# Patient Record
Sex: MALE | Race: WHITE | NOT HISPANIC OR LATINO | Employment: STUDENT | URBAN - METROPOLITAN AREA
[De-identification: names, ages, dates, MRNs, and addresses within clinical notes are randomized per-mention and may not be internally consistent; named-entity substitution may affect disease eponyms.]

---
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
## 2021-01-04 ENCOUNTER — TREATMENT (OUTPATIENT)
Dept: PHYSICAL THERAPY | Facility: CLINIC | Age: 21
End: 2021-01-04

## 2021-01-04 DIAGNOSIS — Z74.09 IMPAIRED MOBILITY AND ACTIVITIES OF DAILY LIVING: ICD-10-CM

## 2021-01-04 DIAGNOSIS — Z78.9 IMPAIRED MOBILITY AND ACTIVITIES OF DAILY LIVING: ICD-10-CM

## 2021-01-04 DIAGNOSIS — Z98.890 STATUS POST SHOULDER SURGERY: Primary | ICD-10-CM

## 2021-01-04 DIAGNOSIS — M25.512 LEFT SHOULDER PAIN, UNSPECIFIED CHRONICITY: ICD-10-CM

## 2021-01-04 PROCEDURE — 97110 THERAPEUTIC EXERCISES: CPT | Performed by: PHYSICAL THERAPIST

## 2021-01-04 PROCEDURE — 97140 MANUAL THERAPY 1/> REGIONS: CPT | Performed by: PHYSICAL THERAPIST

## 2021-01-04 NOTE — PROGRESS NOTES
Physical Therapy Daily Progress Note      Visit # 3      Subjective   Pt reports continued improvement. No c/o pain.    Objective   See Exercise, Manual, and Modality Logs for complete treatment.     Assessment/Plan  No complaints with exercise progressions. PROM near normal limits. Progress per POC.           Manual Therapy:    10     mins  93837;  Therapeutic Exercise:    30     mins  86439;     Neuromuscular Zach:    0    mins  72500;    Therapeutic Activity:     0     mins  44797;     Gait Trainin     mins  20541;     Ultrasound:     0     mins  42324;    Work Hardening           0      mins 97834  Iontophoresis               0   mins 27928    Timed Treatment:   40   mins   Total Treatment:     45   mins    Jessica Pardo, PT  Physical Therapist

## 2021-01-07 ENCOUNTER — TREATMENT (OUTPATIENT)
Dept: PHYSICAL THERAPY | Facility: CLINIC | Age: 21
End: 2021-01-07

## 2021-01-07 DIAGNOSIS — M25.512 LEFT SHOULDER PAIN, UNSPECIFIED CHRONICITY: ICD-10-CM

## 2021-01-07 DIAGNOSIS — Z98.890 STATUS POST SHOULDER SURGERY: Primary | ICD-10-CM

## 2021-01-07 DIAGNOSIS — Z78.9 IMPAIRED MOBILITY AND ACTIVITIES OF DAILY LIVING: ICD-10-CM

## 2021-01-07 DIAGNOSIS — Z74.09 IMPAIRED MOBILITY AND ACTIVITIES OF DAILY LIVING: ICD-10-CM

## 2021-01-07 PROCEDURE — 97140 MANUAL THERAPY 1/> REGIONS: CPT | Performed by: PHYSICAL THERAPIST

## 2021-01-07 PROCEDURE — 97530 THERAPEUTIC ACTIVITIES: CPT | Performed by: PHYSICAL THERAPIST

## 2021-01-07 PROCEDURE — 97110 THERAPEUTIC EXERCISES: CPT | Performed by: PHYSICAL THERAPIST

## 2021-01-07 NOTE — PROGRESS NOTES
Physical Therapy Daily Progress Note      Visit # 4      Subjective   No new changes.    Objective   See Exercise, Manual, and Modality Logs for complete treatment.       Assessment/Plan  Progressing well per protocol w/o c/o pain. Issued band for isometric walk outs. Very little discomfort with AAROM IR reach to sacrum. Continues to guad w/ PROM abduction. Progress per POC.           Manual Therapy:    10     mins  75214;  Therapeutic Exercise:    15     mins  52320;     Neuromuscular Zach:    0    mins  46493;    Therapeutic Activity:     15     mins  87425;     Gait Trainin     mins  23849;     Ultrasound:     0     mins  80780;    Work Hardening           0      mins 51097  Iontophoresis               0   mins 62362    Timed Treatment:   40   mins   Total Treatment:     50   mins    Jessica Pardo, PT  Physical Therapist

## 2021-01-11 ENCOUNTER — TREATMENT (OUTPATIENT)
Dept: PHYSICAL THERAPY | Facility: CLINIC | Age: 21
End: 2021-01-11

## 2021-01-11 DIAGNOSIS — Z98.890 STATUS POST SHOULDER SURGERY: Primary | ICD-10-CM

## 2021-01-11 DIAGNOSIS — M25.512 LEFT SHOULDER PAIN, UNSPECIFIED CHRONICITY: ICD-10-CM

## 2021-01-11 DIAGNOSIS — Z78.9 IMPAIRED MOBILITY AND ACTIVITIES OF DAILY LIVING: ICD-10-CM

## 2021-01-11 DIAGNOSIS — Z74.09 IMPAIRED MOBILITY AND ACTIVITIES OF DAILY LIVING: ICD-10-CM

## 2021-01-11 PROCEDURE — 97140 MANUAL THERAPY 1/> REGIONS: CPT | Performed by: PHYSICAL THERAPIST

## 2021-01-11 PROCEDURE — 97110 THERAPEUTIC EXERCISES: CPT | Performed by: PHYSICAL THERAPIST

## 2021-01-11 NOTE — PROGRESS NOTES
Physical Therapy Daily Progress Note      Visit # 5      Subjective   No new changes.     Objective   See Exercise, Manual, and Modality Logs for complete treatment.     Assessment/Plan  Progressing well w/o c/o pain. Updated HEP to include sidelying ER and towel IR reach. Progress per POC.           Manual Therapy:    15     mins  54435;  Therapeutic Exercise:    25     mins  41656;     Neuromuscular Zach:    0    mins  99537;    Therapeutic Activity:     0     mins  07474;     Gait Trainin     mins  91378;     Ultrasound:     0     mins  21552;    Work Hardening           0      mins 41838  Iontophoresis               0   mins 79152    Timed Treatment:   40   mins   Total Treatment:     45   mins    Jessica Pardo, REGAN  Physical Therapist

## 2021-01-14 ENCOUNTER — TREATMENT (OUTPATIENT)
Dept: PHYSICAL THERAPY | Facility: CLINIC | Age: 21
End: 2021-01-14

## 2021-01-14 DIAGNOSIS — Z98.890 STATUS POST SHOULDER SURGERY: Primary | ICD-10-CM

## 2021-01-14 DIAGNOSIS — Z78.9 IMPAIRED MOBILITY AND ACTIVITIES OF DAILY LIVING: ICD-10-CM

## 2021-01-14 DIAGNOSIS — M25.512 LEFT SHOULDER PAIN, UNSPECIFIED CHRONICITY: ICD-10-CM

## 2021-01-14 DIAGNOSIS — Z74.09 IMPAIRED MOBILITY AND ACTIVITIES OF DAILY LIVING: ICD-10-CM

## 2021-01-14 PROCEDURE — 97110 THERAPEUTIC EXERCISES: CPT | Performed by: PHYSICAL THERAPIST

## 2021-01-14 PROCEDURE — 97530 THERAPEUTIC ACTIVITIES: CPT | Performed by: PHYSICAL THERAPIST

## 2021-01-14 NOTE — PROGRESS NOTES
Physical Therapy Daily Progress Note      Visit # 6      Subjective   No issues.    Objective   See Exercise, Manual, and Modality Logs for complete treatment.       Assessment/Plan  Excellent tolerance to all therapeutic activities w/o c/o pain. Able to consistently progress weight/resistance with strengthening. Mild tightness with PROM persists. Overall, PROM near normal limits. Encouraged pt to schedule with PT clinic [facility].          Manual Therapy:    5     mins  18201;  Therapeutic Exercise:    25     mins  65760;     Neuromuscular Zach:    0    mins  74171;    Therapeutic Activity:     15     mins  43444;     Gait Trainin     mins  38013;     Ultrasound:     0     mins  98176;    Work Hardening           0      mins 90893  Iontophoresis               0   mins 79564    Timed Treatment:   45   mins   Total Treatment:     55   mins    Jessica Pardo, PT  Physical Therapist

## 2021-01-15 ENCOUNTER — OFFICE VISIT (OUTPATIENT)
Dept: ORTHOPEDIC SURGERY | Facility: CLINIC | Age: 21
End: 2021-01-15

## 2021-01-15 VITALS — BODY MASS INDEX: 26.11 KG/M2 | TEMPERATURE: 97.4 F | WEIGHT: 182.4 LBS | HEIGHT: 70 IN

## 2021-01-15 DIAGNOSIS — Z98.890 STATUS POST LABRAL REPAIR OF SHOULDER: Primary | ICD-10-CM

## 2021-01-15 PROCEDURE — 99024 POSTOP FOLLOW-UP VISIT: CPT | Performed by: ORTHOPAEDIC SURGERY

## 2021-01-15 NOTE — PROGRESS NOTES
Labral Repair     Patient: Lobo Linder        YOB: 2000      Chief Complaints: left shoulder pain      History of Present Illness: Pt is here f/u shoulder arthroscopy, labral repair he is doing great he is about 5 weeks out has no pain is progressing with physical therapy understands his limitations        Allergies: No Known Allergies    Medications:   Home Medications:  Current Outpatient Medications on File Prior to Visit   Medication Sig   • fexofenadine (ALLEGRA) 180 MG tablet Take 180 mg by mouth Daily.   • ondansetron (Zofran) 4 MG tablet Take 1 tablet by mouth Every 8 (Eight) Hours As Needed for Nausea or Vomiting.   • oxyCODONE-acetaminophen (PERCOCET) 5-325 MG per tablet Take 1-2 tablets by mouth every 4 hours as needed for severe pain     No current facility-administered medications on file prior to visit.      Current Medications:  Scheduled Meds:  Continuous Infusions:No current facility-administered medications for this visit.     PRN Meds:.          Physical Exam: 20 y.o. male  General Appearance:    Alert, cooperative, in no acute distress                 There were no vitals filed for this visit.   Patient is alert and oriented ×3 no acute distress normal mood physical exam.  Physical exam of the shoulder, incisions looked good there is no erythema,no signs or sx of infection.  Range of motion is full with exception of mild limitation of external rotation which I like and want to see  Assessment  S/P shoulder scope labral repair, overall doing good.          Plan:  Reiterate precautions he will continue to physical therapy.  He is headed back to [place] to school on Sunday therefore I will give him a new therapy prescription a copy of his op note and a copy of the protocol he wants to start flying again I want him to wait at least 3 more weeks till he is 8 weeks out at that point he will go to the simulator to make sure he is safe and then flying will always be with one of his  instructors I will see him back in some around 8 weeks when he is back in town

## 2021-01-21 ENCOUNTER — TREATMENT (OUTPATIENT)
Dept: PHYSICAL THERAPY | Facility: CLINIC | Age: 21
End: 2021-01-21

## 2021-01-21 DIAGNOSIS — M25.512 LEFT SHOULDER PAIN, UNSPECIFIED CHRONICITY: ICD-10-CM

## 2021-01-21 DIAGNOSIS — Z98.890 STATUS POST SHOULDER SURGERY: Primary | ICD-10-CM

## 2021-01-21 PROCEDURE — 97110 THERAPEUTIC EXERCISES: CPT | Performed by: PHYSICAL THERAPIST

## 2021-01-21 PROCEDURE — 97112 NEUROMUSCULAR REEDUCATION: CPT | Performed by: PHYSICAL THERAPIST

## 2021-01-21 NOTE — PROGRESS NOTES
Physical Therapy Daily Progress Note      Visit #: 7    Lobo Linder reports 0/10 pain today at rest.  Pt reports that he has no pain and has not had any pain. Pt reports that he is sleeping comfortably.         Objective Pt present to PT today with no distress at rest.     Pt with no tenderness over anterior L shoulder.     Pt with limited IR due to tightness but no pain at end range.     Pt tolerated activities well today without pain in the L shoulder.       See Exercise, Manual, and Modality Logs for complete treatment.     Assessment/Plan  Pt is doing well and is 6 weeks out of surgery. Pt to continue with scapular stabilization and isometric shoulder strengthening. Pt would benefit from PT to help increase L shoulder strength, activity tolerance, and function.       Progress per Plan of Care  Progress as tolerated    Visit Diagnosis:    ICD-10-CM ICD-9-CM   1. Status post shoulder surgery  Z98.890 V45.89   2. Left shoulder pain, unspecified chronicity  M25.512 719.41            Manual Therapy:    4     mins  14171;  Therapeutic Exercise:    30     mins  45736;     Neuromuscular Zcah:    12    mins  68699;    Therapeutic Activity:          mins  37567;     Gait Training:           mins  64929;     Ultrasound:          mins  19683;    Electrical Stimulation:         mins  39868 ( );  Dry Needling          mins self-pay  Iontophoresis          mins 18585      Timed Treatment:   46   mins   Total Treatment:     50   mins    Damion Atkinson, PT  Physical Therapist

## 2021-01-27 ENCOUNTER — TREATMENT (OUTPATIENT)
Dept: PHYSICAL THERAPY | Facility: CLINIC | Age: 21
End: 2021-01-27

## 2021-01-27 DIAGNOSIS — Z78.9 IMPAIRED MOBILITY AND ACTIVITIES OF DAILY LIVING: ICD-10-CM

## 2021-01-27 DIAGNOSIS — M25.512 LEFT SHOULDER PAIN, UNSPECIFIED CHRONICITY: ICD-10-CM

## 2021-01-27 DIAGNOSIS — Z74.09 IMPAIRED MOBILITY AND ACTIVITIES OF DAILY LIVING: ICD-10-CM

## 2021-01-27 DIAGNOSIS — Z98.890 STATUS POST SHOULDER SURGERY: Primary | ICD-10-CM

## 2021-01-27 PROCEDURE — 97112 NEUROMUSCULAR REEDUCATION: CPT | Performed by: PHYSICAL THERAPIST

## 2021-01-27 PROCEDURE — 97110 THERAPEUTIC EXERCISES: CPT | Performed by: PHYSICAL THERAPIST

## 2021-01-27 NOTE — PROGRESS NOTES
Physical Therapy Daily Progress Note      Visit #: 8    Lobo Linder reports 0/10 pain today at rest.  Pt reports no problems or pain after last visit or over the last week.         Objective Pt present to PT today with no distress at rest.     Pt with slight limitation in IR of the L shoulder when scapula held steady.     Pt with no increased pain in the L shoulder with activities in the clinic today.       See Exercise, Manual, and Modality Logs for complete treatment.     Assessment/Plan  Pt continues to progress well without pain in the L shoulder and is maintaining motion in the L shoulder. Pt would benefit from PT to help improve L shoulder stability, strength, and activity tolerance to return to pain free function.       Progress per Plan of Care  Progress as tolerated    Visit Diagnosis:    ICD-10-CM ICD-9-CM   1. Status post shoulder surgery  Z98.890 V45.89   2. Left shoulder pain, unspecified chronicity  M25.512 719.41   3. Impaired mobility and activities of daily living  Z74.09 V49.89    Z78.9             Manual Therapy:    4     mins  15251;  Therapeutic Exercise:    27     mins  48625;     Neuromuscular Zach:    23    mins  78311;    Therapeutic Activity:          mins  01230;     Gait Training:           mins  39861;     Ultrasound:          mins  23926;    Electrical Stimulation:         mins  10701 ( );  Dry Needling          mins self-pay  Iontophoresis          mins 33966      Timed Treatment:   54   mins   Total Treatment:     54   mins    Damion Atkinson, PT  Physical Therapist

## 2021-02-01 ENCOUNTER — TREATMENT (OUTPATIENT)
Dept: PHYSICAL THERAPY | Facility: CLINIC | Age: 21
End: 2021-02-01

## 2021-02-01 DIAGNOSIS — Z98.890 STATUS POST SHOULDER SURGERY: Primary | ICD-10-CM

## 2021-02-01 DIAGNOSIS — M25.512 LEFT SHOULDER PAIN, UNSPECIFIED CHRONICITY: ICD-10-CM

## 2021-02-01 PROCEDURE — 97110 THERAPEUTIC EXERCISES: CPT | Performed by: PHYSICAL THERAPIST

## 2021-02-01 PROCEDURE — 97112 NEUROMUSCULAR REEDUCATION: CPT | Performed by: PHYSICAL THERAPIST

## 2021-02-01 NOTE — PROGRESS NOTES
Physical Therapy Daily Progress Note      Visit #: 9    Lobo Linder reports 0/10 pain today at rest.  Pt reports that he has felt fine and that he has good motion. Pt reports that he wants to get his strength back up. Pt reports that he has not had any pain since last session or soreness following last visit.         Objective Pt present to PT today with no distress at rest.     L shoulder IR: 73 degrees    Pt with full PROM in L shoulder with all other motions. Lacking IR may be due to increased ER of the L shoulder.     Pt tolerated activities well today without any pain in the L shoulder.       See Exercise, Manual, and Modality Logs for complete treatment.     Assessment/Plan  Pt is doing well with good motion actively and passively in the L shoulder with no pain. Pt to continue with PT to help improve L shoulder strength, stability, and function.       Progress per Plan of Care  Progress strengthening    Visit Diagnosis:    ICD-10-CM ICD-9-CM   1. Status post shoulder surgery  Z98.890 V45.89   2. Left shoulder pain, unspecified chronicity  M25.512 719.41            Manual Therapy:    4     mins  32664;  Therapeutic Exercise:    30     mins  90114;     Neuromuscular Zach:    24    mins  73595;    Therapeutic Activity:          mins  27435;     Gait Training:           mins  41334;     Ultrasound:          mins  11589;    Electrical Stimulation:         mins  23313 ( );  Dry Needling          mins self-pay  Iontophoresis          mins 71884      Timed Treatment:   58   mins   Total Treatment:     58   mins    Damion Atkinson, PT  Physical Therapist

## 2021-02-08 ENCOUNTER — TREATMENT (OUTPATIENT)
Dept: PHYSICAL THERAPY | Facility: CLINIC | Age: 21
End: 2021-02-08

## 2021-02-08 DIAGNOSIS — M25.512 LEFT SHOULDER PAIN, UNSPECIFIED CHRONICITY: ICD-10-CM

## 2021-02-08 DIAGNOSIS — Z98.890 STATUS POST SHOULDER SURGERY: Primary | ICD-10-CM

## 2021-02-08 PROCEDURE — 97112 NEUROMUSCULAR REEDUCATION: CPT | Performed by: PHYSICAL THERAPIST

## 2021-02-08 PROCEDURE — 97110 THERAPEUTIC EXERCISES: CPT | Performed by: PHYSICAL THERAPIST

## 2021-02-08 NOTE — PROGRESS NOTES
Physical Therapy Daily Progress Note      Visit #: 10    Lobo Linder reports 0/10 pain today at rest.  Pt reports no pain after last session and no pain with daily activities. Pt reports that he has had no trouble with the L shoulder.         Objective Pt present to PT today with no distress at rest.     Pt tolerated activities well today without pain in the L shoulder.     Pt with fatigue in the L shoulder with OH activities       See Exercise, Manual, and Modality Logs for complete treatment.     Assessment/Plan  Pt continues to improve with activities in the clinic with no increased pain in the L shoulder. Pt does fatigue with activities OH and would benefit from PT to help improve activity tolerance and L shoulder stability.       Progress per Plan of Care  Progress OH strengthening    Visit Diagnosis:    ICD-10-CM ICD-9-CM   1. Status post shoulder surgery  Z98.890 V45.89   2. Left shoulder pain, unspecified chronicity  M25.512 719.41            Manual Therapy:         mins  39463;  Therapeutic Exercise:    30     mins  05046;     Neuromuscular Zach:    24    mins  85551;    Therapeutic Activity:          mins  87464;     Gait Training:           mins  97867;     Ultrasound:          mins  33086;    Electrical Stimulation:         mins  02304 ( );  Dry Needling          mins self-pay  Iontophoresis          mins 56190      Timed Treatment:   54   mins   Total Treatment:     55   mins    Damion Atkinson, PT  Physical Therapist

## 2021-02-12 ENCOUNTER — TREATMENT (OUTPATIENT)
Dept: PHYSICAL THERAPY | Facility: CLINIC | Age: 21
End: 2021-02-12

## 2021-02-12 DIAGNOSIS — M25.512 LEFT SHOULDER PAIN, UNSPECIFIED CHRONICITY: ICD-10-CM

## 2021-02-12 DIAGNOSIS — Z98.890 STATUS POST SHOULDER SURGERY: Primary | ICD-10-CM

## 2021-02-12 PROCEDURE — 97112 NEUROMUSCULAR REEDUCATION: CPT | Performed by: PHYSICAL THERAPIST

## 2021-02-12 PROCEDURE — 97110 THERAPEUTIC EXERCISES: CPT | Performed by: PHYSICAL THERAPIST

## 2021-02-12 NOTE — PROGRESS NOTES
Physical Therapy Daily Progress Note      Visit #: 11    Lobo Linder reports 0/10 pain today at rest.  Pt reports that he has not had any issues since last visit. Pt reports that even with the increased intensity last visit, he had no pain in the L shoulder.         Objective Pt present to PT today with no distress at rest.     Pt tolerated activities in the clinic well today without pain in the L shoulder.     Pt tolerated OH activities in the clinic without pain.       See Exercise, Manual, and Modality Logs for complete treatment.     Assessment/Plan  Pt continues to do well with activities in the clinic and have no pain in the L shoulder with resistance exercises. Pt to continue to progress OH exercise and increase stabilization and strengthening activities to improve L shoulder function.       Progress per Plan of Care  Assess reaction to activities in the clinic    Visit Diagnosis:    ICD-10-CM ICD-9-CM   1. Status post shoulder surgery  Z98.890 V45.89   2. Left shoulder pain, unspecified chronicity  M25.512 719.41            Manual Therapy:         mins  84141;  Therapeutic Exercise:    39     mins  14179;     Neuromuscular Zach:    17    mins  49496;    Therapeutic Activity:          mins  30769;     Gait Training:           mins  53721;     Ultrasound:          mins  58853;    Electrical Stimulation:         mins  22886 ( );  Dry Needling          mins self-pay  Iontophoresis          mins 18128      Timed Treatment:   56   mins   Total Treatment:     58   mins    Damion Atkinson, PT  Physical Therapist

## 2021-04-02 ENCOUNTER — IMMUNIZATION (OUTPATIENT)
Dept: VACCINE CLINIC | Facility: HOSPITAL | Age: 21
End: 2021-04-02

## 2021-04-02 PROCEDURE — 91300 HC SARSCOV02 VAC 30MCG/0.3ML IM: CPT | Performed by: INTERNAL MEDICINE

## 2021-04-02 PROCEDURE — 0001A: CPT | Performed by: INTERNAL MEDICINE

## 2021-04-28 ENCOUNTER — IMMUNIZATION (OUTPATIENT)
Dept: VACCINE CLINIC | Facility: HOSPITAL | Age: 21
End: 2021-04-28

## 2021-04-28 PROCEDURE — 91300 HC SARSCOV02 VAC 30MCG/0.3ML IM: CPT | Performed by: INTERNAL MEDICINE

## 2021-04-28 PROCEDURE — 0002A: CPT | Performed by: INTERNAL MEDICINE

## 2022-02-17 NOTE — PROGRESS NOTES
Labral Repair 1st visit    Patient: Lobo Linder        YOB: 2000      Chief Complaints: shoulder pain left      History of Present Illness: Pt is here f/u shoulder arthroscopy, labral repair on his left shoulder he is doing great states he only takes an occasional pain medicine overall is doing well        Allergies: No Known Allergies    Medications:   Home Medications:  Current Outpatient Medications on File Prior to Visit   Medication Sig   • fexofenadine (ALLEGRA) 180 MG tablet Take 180 mg by mouth Daily.   • ondansetron (Zofran) 4 MG tablet Take 1 tablet by mouth Every 8 (Eight) Hours As Needed for Nausea or Vomiting.   • oxyCODONE-acetaminophen (PERCOCET) 5-325 MG per tablet Take 1-2 tablets by mouth every 4 hours as needed for severe pain     No current facility-administered medications on file prior to visit.      Current Medications:  Scheduled Meds:  Continuous Infusions:No current facility-administered medications for this visit.     PRN Meds:.          Physical Exam: 20 y.o. male  General Appearance:    Alert, cooperative, in no acute distress                 There were no vitals filed for this visit.   Patient is alert and oriented ×3 no acute distress normal mood physical exam.  Physical exam of the shoulder, incisions looked good there is no erythema,no signs or sx of infection.    Assessment  S/P shoulder scope.  I did review intraoperative findings and arthroscopic pictures with the patient.          Plan: To remove sutures today place Steri-Strips, review restrictions and f/u in 4 weeks I will give him a regular sling to transition to we talked about restrictions we will start physical therapy the week after next I will see him back 4 weeks              
Abdominal Pain, N/V/D

## 2022-07-07 ENCOUNTER — OFFICE VISIT (OUTPATIENT)
Dept: SPORTS MEDICINE | Facility: CLINIC | Age: 22
End: 2022-07-07

## 2022-07-07 VITALS
SYSTOLIC BLOOD PRESSURE: 120 MMHG | HEIGHT: 70 IN | BODY MASS INDEX: 25.91 KG/M2 | OXYGEN SATURATION: 99 % | DIASTOLIC BLOOD PRESSURE: 60 MMHG | WEIGHT: 181 LBS | HEART RATE: 64 BPM | RESPIRATION RATE: 16 BRPM | TEMPERATURE: 98.1 F

## 2022-07-07 DIAGNOSIS — D68.51 FACTOR V LEIDEN MUTATION: ICD-10-CM

## 2022-07-07 DIAGNOSIS — Z00.00 ANNUAL PHYSICAL EXAM: Primary | ICD-10-CM

## 2022-07-07 DIAGNOSIS — F90.0 ADHD (ATTENTION DEFICIT HYPERACTIVITY DISORDER), INATTENTIVE TYPE: ICD-10-CM

## 2022-07-07 PROBLEM — S43.439A LABRAL TEAR OF SHOULDER: Status: RESOLVED | Noted: 2020-11-09 | Resolved: 2022-07-07

## 2022-07-07 PROCEDURE — 99395 PREV VISIT EST AGE 18-39: CPT | Performed by: FAMILY MEDICINE

## 2022-07-07 PROCEDURE — 99213 OFFICE O/P EST LOW 20 MIN: CPT | Performed by: FAMILY MEDICINE

## 2022-07-07 RX ORDER — ATOMOXETINE 40 MG/1
40 CAPSULE ORAL DAILY
Qty: 30 CAPSULE | Refills: 1 | Status: SHIPPED | OUTPATIENT
Start: 2022-07-07

## 2022-07-07 NOTE — PROGRESS NOTES
"Lobo Linder is here today for an annual physical exam.     Eating a healthy diet. Exercising routinely.     Transferred to [place] from [facility]. Started [employer] classes online in June, in person this fall.  Still feels like L shoulder is \"sliding.\" Trying to get to band work. Will f/up w/Dr. Mccann.    I have reviewed the patient's medical, family, and social history in detail and updated the computerized patient record.    Health Maintenance   Topic Date Due   • HPV VACCINES (1 - Male 2-dose series) Never done   • HEPATITIS C SCREENING  Never done   • TDAP/TD VACCINES (2 - Td or Tdap) 07/16/2022   • INFLUENZA VACCINE  10/01/2022   • ANNUAL PHYSICAL  07/08/2023   • COVID-19 Vaccine  Completed   • MENINGOCOCCAL VACCINE  Completed   • Pneumococcal Vaccine 0-64  Aged Out         /60 (BP Location: Right arm, Patient Position: Sitting, Cuff Size: Adult)   Pulse 64   Temp 98.1 °F (36.7 °C) (Temporal)   Resp 16   Ht 177.8 cm (70\")   Wt 82.1 kg (181 lb)   SpO2 99%   BMI 25.97 kg/m²      Physical Exam    Mask worn throughout encounter  Vital signs reviewed.  General appearance: No acute distress  Eyes: conjunctiva clear without erythema; pupils equally round and reactive  ENT: external ears and nose normal; hearing normal   Neck: supple; no thyromegaly  CV: normal rate and rhythm; no peripheral edema  Respiratory: normal respiratory effort; lungs clear to auscultation bilaterally  GI: Bowel sounds x4, soft, nontender  MSK: normal gait and station; no focal joint deformity or swelling  Skin: no rash or wounds; normal turgor  Neuro: cranial nerves 2-12 grossly intact; normal sensation to light touch  Psych: mood and affect normal; recent and remote memory intact    No visits with results within 2 Week(s) from this visit.   Latest known visit with results is:   Lab on 12/07/2020   Component Date Value Ref Range Status   • SARS-CoV-2 PCR 12/07/2020 Not Detected  Not Detected Final    Nucleic acid specific to " SARS-CoV-2 (COVID-19) virus was not detected in  this sample by the TaqPath (TM) COVID-19 Combo Kit.          SARS-CoV-2 (COVID-19) nucleic acid testing performed using [employer] Aptima (R) SARS-CoV-2 Assay or [employer] TaqPath (TM)  COVID-19 Combo Kit as indicated above under Emergency Use Authorization (EUA) from the FDA. Aptima (R) and TaqPath (TM) test performance  characteristics verified by [facility] in accordance with the FDAs Guidance Document (Policy for Diagnostic Tests for Coronavirus Disease-2019  during the Public Health Emergency) issued on March 16, 2020. The laboratory is regulated under CLIA as qualified to perform high-complexity testing  Unless otherwise noted, all testing was performed at [facility], CLIA No. [id number], [place] State Licensee No. [id number]        Diagnoses and all orders for this visit:    1. Annual physical exam (Primary)    2. Factor V Leiden mutation (HCC)        Encourage healthy diet and exercise.  Encourage patient to stay up to date on screening examinations as indicated based on age and risk factors.    EMR Dragon/Transcription disclaimer:    Much of this encounter note is an electronic transcription/translation of spoken language to printed text.  The electronic translation of spoken language may permit erroneous, or at times, nonsensical words or phrases to be inadvertently transcribed.  Although I have reviewed the note for such errors some may still exist.

## 2022-07-07 NOTE — PROGRESS NOTES
"Lobo is a 22 y.o. year old male presents to [facility]    Chief Complaint   Patient presents with   • Annual Exam     CPE        History of Present Illness  H/o ADHD. eval at [facility] 2017. Brought paperwork. Having difficulty concentrating. Would like to trial another Rx. Adderal XR - more labile emotionally when on it.    I have reviewed the patient's medical, family, and social history in detail and updated the computerized patient record.    /60 (BP Location: Right arm, Patient Position: Sitting, Cuff Size: Adult)   Pulse 64   Temp 98.1 °F (36.7 °C) (Temporal)   Resp 16   Ht 177.8 cm (70\")   Wt 82.1 kg (181 lb)   SpO2 99%   BMI 25.97 kg/m²      Physical Exam    Mask worn thru encounter  Vital signs reviewed.   General: No acute distress.  Eyes: conjunctiva clear; pupils equally round and reactive  ENT: external ears atraumatic  CV: no peripheral edema  Resp: normal respiratory effort, no use of accessory muscles  Skin: no rashes or wounds; normal turgor  Psych: mood and affect appropriate; recent and remote memory intact  Neuro: sensation to light touch intact               Diagnoses and all orders for this visit:    Annual physical exam    Factor V Leiden mutation (HCC)    ADHD (attention deficit hyperactivity disorder), inattentive type  -     atomoxetine (Strattera) 40 MG capsule; Take 1 capsule by mouth Daily.      Reviewed outside records, specifically his ADHD testing performed in 2017.  From clinical perspective, it does not seem as though things have changed significantly in this interim.  We discussed treatment options, medications specific.  Trial of Strattera, side effects discussed.  Drug holiday discussed.  Follow-up in 6 weeks.      Follow Up   Return in about 6 weeks (around 8/18/2022) for Recheck.  Patient was given instructions and counseling regarding his condition or for health maintenance advice. Please see specific information pulled " into the AVS if appropriate.     EMR Dragon/Transcription disclaimer:    Much of this encounter note is an electronic transcription/translation of spoken language to printed text.  The electronic translation of spoken language may permit erroneous, or at times, nonsensical words or phrases to be inadvertently transcribed.  Although I have reviewed the note for such errors some may still exist.

## 2022-08-18 ENCOUNTER — OFFICE VISIT (OUTPATIENT)
Dept: SPORTS MEDICINE | Facility: CLINIC | Age: 22
End: 2022-08-18

## 2022-08-18 VITALS
RESPIRATION RATE: 16 BRPM | SYSTOLIC BLOOD PRESSURE: 128 MMHG | TEMPERATURE: 97.1 F | DIASTOLIC BLOOD PRESSURE: 70 MMHG | BODY MASS INDEX: 26.92 KG/M2 | OXYGEN SATURATION: 98 % | HEIGHT: 70 IN | HEART RATE: 84 BPM | WEIGHT: 188 LBS

## 2022-08-18 DIAGNOSIS — D22.9 BENIGN NEVUS: ICD-10-CM

## 2022-08-18 DIAGNOSIS — F90.0 ADHD (ATTENTION DEFICIT HYPERACTIVITY DISORDER), INATTENTIVE TYPE: Primary | ICD-10-CM

## 2022-08-18 PROCEDURE — 99213 OFFICE O/P EST LOW 20 MIN: CPT | Performed by: FAMILY MEDICINE

## 2022-08-18 NOTE — PROGRESS NOTES
"Lobo is a 22 y.o. year old male presents to [facility]    Chief Complaint   Patient presents with   • Follow-up     6 wk f/u from CPE on 07/07/2022        History of Present Illness  F/up ADHD: \"I'm loving Strattera.\" Taking only as needed for attention at this time. Is helping him keep his focus for time needed. No adverse effects reported - no GI upset, loss of appetite, loss of sleep. Takes in AM when needed.    Additionally, request evaluation of mole on his left lower leg.  Has been there for years.  Has not changed in size though he has noticed some clearing of the middle of it.    I have reviewed the patient's medical, family, and social history in detail and updated the computerized patient record.    /70 (BP Location: Left arm, Patient Position: Sitting, Cuff Size: Adult)   Pulse 84   Temp 97.1 °F (36.2 °C) (Temporal)   Resp 16   Ht 177.8 cm (70\")   Wt 85.3 kg (188 lb)   SpO2 98%   BMI 26.98 kg/m²      Physical Exam    Mask worn thru encounter  Vital signs reviewed.   General: No acute distress.  Eyes: conjunctiva clear; pupils equally round and reactive  ENT: external ears atraumatic  CV: no peripheral edema  Resp: normal respiratory effort, no use of accessory muscles  Skin: no rashes or wounds; normal turgor; benign nevus along the left lower extremity that measures 5 x 3 cm.  See scanned media.  Psych: mood and affect appropriate; recent and remote memory intact  Neuro: sensation to light touch intact                 Diagnoses and all orders for this visit:    ADHD (attention deficit hyperactivity disorder), inattentive type    Benign nevus    1.  Doing well with Strattera.  No adverse effects reported.  Refill as needed.  No monitoring required as this is not a controlled substance.  2.  Reassurance given.  Photo taken, scanned in media.  Monitor for any changes.      Follow Up   Return in about 1 year (around 8/18/2023) for CPE.  Patient was given " instructions and counseling regarding his condition or for health maintenance advice. Please see specific information pulled into the AVS if appropriate.     EMR Dragon/Transcription disclaimer:    Much of this encounter note is an electronic transcription/translation of spoken language to printed text.  The electronic translation of spoken language may permit erroneous, or at times, nonsensical words or phrases to be inadvertently transcribed.  Although I have reviewed the note for such errors some may still exist.

## 2022-10-21 ENCOUNTER — CLINICAL SUPPORT (OUTPATIENT)
Dept: SPORTS MEDICINE | Facility: CLINIC | Age: 22
End: 2022-10-21

## 2022-10-21 DIAGNOSIS — Z11.1 SCREENING-PULMONARY TB: ICD-10-CM

## 2022-10-21 DIAGNOSIS — Z23 IMMUNIZATION DUE: Primary | ICD-10-CM

## 2022-10-21 PROCEDURE — 90471 IMMUNIZATION ADMIN: CPT | Performed by: FAMILY MEDICINE

## 2022-10-21 PROCEDURE — 90715 TDAP VACCINE 7 YRS/> IM: CPT | Performed by: FAMILY MEDICINE

## 2022-10-25 LAB
GAMMA INTERFERON BACKGROUND BLD IA-ACNC: 0.02 IU/ML
M TB IFN-G BLD-IMP: NEGATIVE
M TB IFN-G CD4+ BCKGRND COR BLD-ACNC: 0.03 IU/ML
M TB IFN-G CD4+CD8+ BCKGRND COR BLD-ACNC: 0.04 IU/ML
MITOGEN IGNF BLD-ACNC: >10 IU/ML
QUANTIFERON INCUBATION: NORMAL
SERVICE CMNT-IMP: NORMAL

## 2024-11-08 ENCOUNTER — OFFICE VISIT (OUTPATIENT)
Dept: ORTHOPEDIC SURGERY | Facility: CLINIC | Age: 24
End: 2024-11-08
Payer: COMMERCIAL

## 2024-11-08 VITALS — WEIGHT: 185.8 LBS | BODY MASS INDEX: 26.6 KG/M2 | HEIGHT: 70 IN | TEMPERATURE: 98.1 F

## 2024-11-08 DIAGNOSIS — S83.512A RUPTURE OF ANTERIOR CRUCIATE LIGAMENT OF LEFT KNEE, INITIAL ENCOUNTER: ICD-10-CM

## 2024-11-08 DIAGNOSIS — M25.562 ACUTE PAIN OF LEFT KNEE: Primary | ICD-10-CM

## 2024-11-08 DIAGNOSIS — M25.562 LEFT KNEE PAIN, UNSPECIFIED CHRONICITY: Primary | ICD-10-CM

## 2024-11-08 NOTE — PROGRESS NOTES
New Knee      Patient: Lobo Linder        YOB: 2000    Medical Record Number: [id number]      Left knee pain  Chief Complaints:       History of Present Illness: This is a 24-year-old male who was playing soccer that mocking bird this week and when he planted he went to stop planted his foot and his knee gave way.  He did not feel a pop he does states she has had an effusion he has had persistent pain and presents for evaluation.  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  No prior history of knee injury        Allergies: No Known Allergies    Medications:   Home Medications:  Current Outpatient Medications on File Prior to Visit   Medication Sig    atomoxetine (Strattera) 40 MG capsule Take 1 capsule by mouth Daily. (Patient not taking: Reported on 11/8/2024)     No current facility-administered medications on file prior to visit.     Current Medications:  Scheduled Meds:  Continuous Infusions:No current facility-administered medications for this visit.    PRN Meds:.    Past Medical History:   Diagnosis Date    ADHD (attention deficit hyperactivity disorder), inattentive type 8/18/2022    Contact dermatitis     Factor V deficiency     Labral tear of shoulder     Seasonal allergies         Past Surgical History:   Procedure Laterality Date    NO PAST SURGERIES      SHOULDER ARTHROSCOPY W/ SUPERIOR LABRAL ANTERIOR POSTERIOR REPAIR Left 12/9/2020    Procedure: SHOULDER ARTHROSCOPY ANTERIOR LABRAL REPAIR;  Surgeon: Loretta Mccann MD;  Location: [facility];  Service: Orthopedics;  Laterality: Left;        Social History     Occupational History    Not on file   Tobacco Use    Smoking status: Never    Smokeless tobacco: Never   Vaping Use    Vaping status: Never Used   Substance and Sexual Activity    Alcohol use: No    Drug use: No    Sexual activity: Defer      Social History     Social History Narrative    Not on file        Family History   Problem  "Relation Age of Onset    Factor V Leiden deficiency Mother     Malig Hyperthermia Neg Hx              Review of Systems:     Review of Systems      Physical Exam: 24 y.o. male  General Appearance:    Alert, cooperative, in no acute distress                   Vitals:    11/08/24 0800   Temp: 98.1 °F (36.7 °C)   TempSrc: Temporal   Weight: 84.3 kg (185 lb 12.8 oz)   Height: 177.8 cm (70\")   PainSc:   3   PainLoc: Knee      Patient is alert and read ×3 no acute distress appears her above-listed at height weight and age.  Affect is normal respiratory rate is normal unlabored. Heart rate regular rate rhythm, sclera, dentition and hearing are normal for the purpose of this exam.        Ortho Exam  physical exam of the left knee he has no overlying skin changes no lymphedema lymphadenopathy they have no effusion is full range of motion they have some mild tenderness laterally no tenderness medially they have pain with bounce home no pain with Carlos he has a positive Lockman.  I do not get a pivot shift however they have a lot of hamstring guarding.  Quads are good calf is soft and nontender there is no overlying skin changes, s good hip range of motion and normal ankle exam   Procedures             Radiology:   AP, Lateral and merchant views of the left knee  were ordered/reviewed to evauateknee pain.  I have no comparative film growth plates are closed I see no abnormality  Imaging Results (Most Recent)       Procedure Component Value Units Date/Time    XR Knee 3 View Left [[id number]] Resulted: 11/08/24 0809     Updated: 11/08/24 0809    Impression:      Ordering physician's impression is located in the Encounter Note dated 11/08/24. X-ray performed in the DR room.            Assessment/Plan:  Left knee injury I am pretty confident his ACL is torn which I discussed with him in detail he really does not have much of an effusion may be a mild effusion hopefully his menisci are okay plan is to proceed with an MRI to " really lay everything on the table we will get him into physical therapy.  He will then have to decide with regard to his job does he want to delay that and get this reconstructed or start working and then reconstruct at a later date I talked him about what he would have to avoid if he decided to do that which I think would be fine

## 2024-11-11 ENCOUNTER — TELEPHONE (OUTPATIENT)
Dept: ORTHOPEDIC SURGERY | Facility: CLINIC | Age: 24
End: 2024-11-11
Payer: COMMERCIAL

## 2024-11-25 NOTE — PROGRESS NOTES
Patient: Lobo Linder  YOB: 2000  Date of Service: 11/25/2024    Chief Complaints: Left knee pain    Subjective:    History of Present Illness: Pt is seen in the office today with complaints of left knee pain he had an injury while playing soccer and had displaced lateral tibial plateau fracture he was post to be on 2 crutches but is really had no pain he is on 1 and decreasing his activity states he has no pain at all        Allergies: No Known Allergies    Medications:   Home Medications:  Current Outpatient Medications on File Prior to Visit   Medication Sig    atomoxetine (Strattera) 40 MG capsule Take 1 capsule by mouth Daily. (Patient not taking: Reported on 11/8/2024)     No current facility-administered medications on file prior to visit.     Current Medications:  Scheduled Meds:  Continuous Infusions:No current facility-administered medications for this visit.    PRN Meds:.    I have reviewed the patient's medical history in detail and updated the computerized patient record.  Review and summarization of old records include:    Past Medical History:   Diagnosis Date    ADHD (attention deficit hyperactivity disorder), inattentive type 8/18/2022    Contact dermatitis     Factor V deficiency     Labral tear of shoulder     Seasonal allergies         Past Surgical History:   Procedure Laterality Date    NO PAST SURGERIES      SHOULDER ARTHROSCOPY W/ SUPERIOR LABRAL ANTERIOR POSTERIOR REPAIR Left 12/9/2020    Procedure: SHOULDER ARTHROSCOPY ANTERIOR LABRAL REPAIR;  Surgeon: Loretta Mccann MD;  Location: [facility];  Service: Orthopedics;  Laterality: Left;        Social History     Occupational History    Not on file   Tobacco Use    Smoking status: Never    Smokeless tobacco: Never   Vaping Use    Vaping status: Never Used   Substance and Sexual Activity    Alcohol use: No    Drug use: No    Sexual activity: Defer      Social History     Social History Narrative    Not on file         Family History   Problem Relation Age of Onset    Factor V Leiden deficiency Mother     [place]ar[place] Hyperthermia Neg Hx        ROS: 14 point review of systems was performed and was negative except for documented findings in HPI and today's encounter.     Allergies: No Known Allergies  Constitutional:  Denies fever, shaking or chills   Eyes:  Denies change in visual acuity   HENT:  Denies nasal congestion or sore throat   Respiratory:  Denies cough or shortness of breath   Cardiovascular:  Denies chest pain or severe LE edema   GI:  Denies abdominal pain, nausea, vomiting, bloody stools or diarrhea   Musculoskeletal:  Numbness, tingling, or loss of motor function only as noted above in history of present illness.  : Denies painful urination or hematuria  Integument:  Denies rash, lesion or ulceration   Neurologic:  Denies headache or focal weakness  Endocrine:  Denies lymphadenopathy  Psych:  Denies confusion or change in mental status   Hem:  Denies active bleeding      Physical Exam: 24 y.o. male  Wt Readings from Last 3 Encounters:   11/08/24 84.3 kg (185 lb 12.8 oz)   08/18/22 85.3 kg (188 lb)   07/07/22 82.1 kg (181 lb)       There is no height or weight on file to calculate BMI.    There were no vitals filed for this visit.  Vital signs reviewed.   General Appearance:    Alert, cooperative, in no acute distress                    Ortho exam      Exam of his knee no effusion no redness full range of motion no palpable tenderness negative bounce home he is a stable Lumenis exam    X-rays AP lateral merchant left knee were taken to evaluate his symptoms and compared to previous films I really cannot say that there is any fracture on these x-rays we did review his MRI results of which are as above      Assessment: Left knee lateral tibial plateau fracture I think he is doing fine I still want him to use the crutch for 1 more week he can then wean off of that but I do not want him doing any weightbearing activity such  as running soccer or basketball for probably another 6 or 8 weeks if he remains is asymptomatic as he is now he can follow-up as needed    Plan:   Follow up as indicated.  Ice, elevate, and rest as needed.  Discussed conservative measures of pain control including ice, bracing.  Loretta Mccann M.D.

## 2024-11-26 ENCOUNTER — OFFICE VISIT (OUTPATIENT)
Dept: ORTHOPEDIC SURGERY | Facility: CLINIC | Age: 24
End: 2024-11-26
Payer: COMMERCIAL

## 2024-11-26 VITALS — WEIGHT: 193.2 LBS | TEMPERATURE: 98.6 F | HEIGHT: 70 IN | BODY MASS INDEX: 27.66 KG/M2

## 2024-11-26 DIAGNOSIS — S82.142D CLOSED FRACTURE OF LEFT TIBIAL PLATEAU WITH ROUTINE HEALING, SUBSEQUENT ENCOUNTER: ICD-10-CM

## 2024-11-26 DIAGNOSIS — R52 PAIN: Primary | ICD-10-CM

## (undated) DEVICE — ABL ASP APOLLO RF XL 90D

## (undated) DEVICE — SKIN PREP TRAY W/CHG: Brand: MEDLINE INDUSTRIES, INC.

## (undated) DEVICE — KT POSTN ARM TRIMANO BEACH CHR W/DRP

## (undated) DEVICE — SHOULDER IMMOBILIZER: Brand: DEROYAL

## (undated) DEVICE — GLV SURG BIOGEL LTX PF 6 1/2

## (undated) DEVICE — PK ARTHSCP SHLDR TOWER 40

## (undated) DEVICE — BLD SHAVER BONECUTTER 5MM 13CM

## (undated) DEVICE — CANN TRPL DAM 7X7MM NO VLV

## (undated) DEVICE — DRSNG WND GZ CURAD OIL EMULSION 3X3IN STRL